# Patient Record
(demographics unavailable — no encounter records)

---

## 2024-11-19 NOTE — HISTORY OF PRESENT ILLNESS
[de-identified] : 16 year old RHD female, presents for initial evaluation c/o left knee pain x 1 month. Patient reports acute onset of intermittent, non-radiating left knee pain, described as "sharp, stabbing" and rated 7/10 on the pain scale. Patient states that she plays softball and volleyball, but denies any direct contact or injury to her left knee. Patient states that pain is worst standing and bending. Patient admits to taking Advil prn, which provides some pain relief. Patient denies prior knee surgery or intra-articular injections. Patient is not participating in PT at this time. Patient ambulates without assistance and can perform ADL's independently. Patient denies any associated knee swelling or buckling.

## 2024-11-19 NOTE — DISCUSSION/SUMMARY
[de-identified] : I went over the pathophysiology of the patient's symptoms in great detail with the patient. I discussed the underlying pathophysiology of the patient's condition in great detail with the patient. I went over the patient's x-rays with them in great detail. At this time, she will start a course of physical therapy for strengthening and flexibility. A prescription was provided. We discussed the use of ice, Tylenol and anti-inflammatories to relieve pain. She should avoid deep squatting, twisting, pivoting and kneeling. We discussed the use of a neoprene sleeve for her left knee.   All of their questions were answered. They understand and consent to the plan.   FU in one month.

## 2024-11-19 NOTE — PHYSICAL EXAM
[de-identified] : Constitutional o Appearance : well-nourished, well developed, alert, in no acute distress  Head and Face o Head :  Inspection : atraumatic, normocephalic o Face :  Inspection : no visible rash or discoloration Respiratory o Respiratory Effort: breathing unlabored  Neurologic o Mental Status Examination :  Orientation : alert and oriented X 3 Psychiatric o Mood and Affect: mood normal, affect appropriate Cardiovascular o Observation/Palpation : - no swelling Lymphatic o Additional Nodes : No palpable lymph nodes present  Right Lower Extremity o Buttock : no tenderness, swelling or deformities  o Right Hip :  Inspection/Palpation : no tenderness, swelling or deformities  Range of Motion : full and painless in all planes, no crepitance  Stability : joint stability intact  Strength : extension, flexion, adduction, abduction, internal rotation and external rotation 5/5   o Right Knee :  Inspection/Palpation : no tenderness to palpation, no swelling  Range of Motion : active flexion and extension full and painless, no crepitance  Stability : no valgus or varus instability present on provocative testing  Strength : flexion and extension 5/5  Tests and Signs : negative Anterior Drawer, negative Lachman, negative Arnoldo  Left Lower Extremity o Buttock : no tenderness, swelling or deformities  o Left Hip :  Inspection/Palpation : no tenderness, no swelling or deformities  Range of Motion : full and painless in all planes, no crepitance  Stability : joint stability intact  Strength : extension, flexion, adduction, abduction, internal rotation and external rotation 5/5  o Left Knee :  Inspection/Palpation : medial facet tenderness, lateral facet tenderness posterior medial and lateral compartment tenderness to palpation, mild swelling  Range of Motion : 0-140 with discomofort active flexion and extension full and painless, no crepitance  Stability : no valgus or varus instability present on provocative testing  Strength : flexion and extension 4/5  Tests and Signs : negative Anterior Drawer, negative Lachman, negative Arnoldo  Gait and Station: Gait: gait normal, no significant extremity swelling or lymphedema, good proprioception and balance  Radiology Results 11/19/2024 o Left Knee : Standing AP, lateral, tunnel, and skyline views of the knee were obtained and reveal growth plates closed no sig degenerative arthritis no osteochondritis no lytic lesions

## 2024-11-19 NOTE — ADDENDUM
[FreeTextEntry1] : I, SELAM CENTENO, acted solely as a scribe for Dr. Theodore Oliver on this date 11/19/2024.  All medical record entries made by the Scribe were at my, Dr. Theodore Oliver, direction and personally dictated by me on 11/19/2024. I have reviewed the chart and agree that the record accurately reflects my personal performance of the history, physical exam, assessment and plan. I have also personally directed, reviewed, and agreed with the chart.

## 2024-12-18 NOTE — PHYSICAL EXAM
[de-identified] : Constitutional o Appearance : well-nourished, well developed, alert, in no acute distress  Head and Face o Head :  Inspection : atraumatic, normocephalic o Face :  Inspection : no visible rash or discoloration Respiratory o Respiratory Effort: breathing unlabored  Neurologic o Mental Status Examination :  Orientation : alert and oriented X 3 Psychiatric o Mood and Affect: mood normal, affect appropriate Cardiovascular o Observation/Palpation : - no swelling Lymphatic o Additional Nodes : No palpable lymph nodes present  Right Lower Extremity o Buttock : no tenderness, swelling or deformities  o Right Hip :  Inspection/Palpation : no tenderness, swelling or deformities  Range of Motion : full and painless in all planes, no crepitance  Stability : joint stability intact  Strength : extension, flexion, adduction, abduction, internal rotation and external rotation 5/5   o Right Knee :  Inspection/Palpation : no tenderness to palpation, no swelling  Range of Motion : active flexion and extension full and painless, no crepitance  Stability : no valgus or varus instability present on provocative testing  Strength : flexion and extension 5/5  Tests and Signs : negative Anterior Drawer, negative Lachman, negative Arnoldo  Left Lower Extremity o Buttock : no tenderness, swelling or deformities  o Left Hip :  Inspection/Palpation : no tenderness, no swelling or deformities  Range of Motion : full and painless in all planes, no crepitance  Stability : joint stability intact  Strength : extension, flexion, adduction, abduction, internal rotation and external rotation 5/5  o Left Knee :  Inspection/Palpation : medial facet tenderness, lateral facet tenderness posterior medial and lateral compartment tenderness to palpation,no swelling, no inferior patella tenderness, painful plica,   Range of Motion : 0-140 with discomofort active flexion and extension full and painless, no crepitance  Stability : no valgus or varus instability present on provocative testing  Strength : flexion and extension 4+/5  Tests and Signs : negative Anterior Drawer, negative Lachman, negative Arnoldo  Gait and Station: Gait: gait normal, tight hamstrings bilaterally, no significant extremity swelling or lymphedema, good proprioception and balance

## 2024-12-18 NOTE — ADDENDUM
[FreeTextEntry1] : I, SELAM CENTENO, acted solely as a scribe for Dr. Theodore Oliver on this date 12/18/2024.  All medical record entries made by the Scribe were at my, Dr. Theodore Oliver, direction and personally dictated by me on 12/18/2024. I have reviewed the chart and agree that the record accurately reflects my personal performance of the history, physical exam, assessment and plan. I have also personally directed, reviewed, and agreed with the chart.

## 2024-12-18 NOTE — HISTORY OF PRESENT ILLNESS
[de-identified] : 16 year old RHD female, presents for a follow-up evaluation c/o left knee pain x 1 month. Patient reports she has intermittent, non-radiating left knee pain, described as "sharp, stabbing" and rated 7/10 on the pain scale. when she does have pain. Patient states that she plays softball and volleyball, but denies any direct contact or injury to her left knee. Patient states that pain is worst standing and bending. Patient admits to taking Advil prn, which provides some pain relief. Patient denies prior knee surgery or intra-articular injections. Patient is participating in PT at this time once or twice a week. She states she is doing exercise everyday at home. Patient ambulates without assistance and can perform ADL's independently. Patient denies any associated knee swelling or buckling. She states physical therapy is helping and her strength has improved.   Radiology Results 11/19/2024 o Left Knee : Standing AP, lateral, tunnel, and skyline views of the knee were obtained and reveal growth plates closed no sig degenerative arthritis no osteochondritis no lytic lesions

## 2025-04-21 NOTE — HISTORY OF PRESENT ILLNESS
[FreeTextEntry1] : Pt is a 16-year-old who presents for Junel FE 1/20 f/u for dysmenorrhea and heavy cycles. Pt reports dysmenorrhea and heavy cycles resolved and is very happy with OCP. No complaints at this time.

## 2025-04-21 NOTE — PHYSICAL EXAM
[de-identified] : Constitutional o Appearance : well-nourished, well developed, alert, in no acute distress  Head and Face o Head :  Inspection : atraumatic, normocephalic o Face :  Inspection : no visible rash or discoloration Respiratory o Respiratory Effort: breathing unlabored  Neurologic o Mental Status Examination :  Orientation : alert and oriented X 3 Psychiatric o Mood and Affect: mood normal, affect appropriate Cardiovascular o Observation/Palpation : - no swelling Lymphatic o Additional Nodes : No palpable lymph nodes present  Right Lower Extremity o Buttock : no tenderness, swelling or deformities  o Right Hip :  Inspection/Palpation : no tenderness, swelling or deformities  Range of Motion : full and painless in all planes, no crepitance  Stability : joint stability intact  Strength : extension, flexion, adduction, abduction, internal rotation and external rotation 4=/5   o Right Knee :  Inspection/Palpation : no medial facet tenderness or lateral facet tenderness  tenderness to palpation, no swelling  Range of Motion : 0-135 active flexion and extension full and painless, no crepitance  Stability : no valgus or varus instability present on provocative testing  Strength : flexion and extension 4+/5  Tests and Signs : negative Anterior Drawer, negative Lachman, negative Arnoldo  Left Lower Extremity o Buttock : no tenderness, swelling or deformities  o Left Hip :  Inspection/Palpation : no tenderness, no swelling or deformities  Range of Motion : full and painless in all planes, no crepitance  Stability : joint stability intact  Strength : extension, flexion, adduction, abduction, internal rotation and external rotation 4+/5  o Left Knee :  Inspection/Palpation : inferior patella tenderness, medial facet tenderness, lateral facet tenderness posterior medial and lateral compartment tenderness to palpation,no swelling, no inferior patella tenderness, painful plica,   Range of Motion : 0-135 with discomofort active flexion and extension full and painless, no crepitance  Stability : no valgus or varus instability present on provocative testing  Strength : flexion and extension 4+/5  Tests and Signs : negative Anterior Drawer, negative Lachman, negative Arnoldo  Gait and Station: Gait: gait normal, tight hamstrings bilaterally, excellent core strength, no significant extremity swelling or lymphedema, good proprioception and balance  Radiology Results 4/21/2025  o Right Knee : Standing AP, lateral and tunnel views of the knee were obtained and revealed skeletal mature no significant degenerative arthritis and mild patella gaye

## 2025-04-21 NOTE — DISCUSSION/SUMMARY
[FreeTextEntry1] : I spent the time noted on the day of this patient encounter preparing for, providing and documenting the above service. I have  counseled and educated the patient on the differential, workup, disease course, and treatment/management plan. Education was provided to the patient during this encounter. All questions and concerns were answered and addressed in detail.  Ivis Cochran NP, FNP-BC

## 2025-04-21 NOTE — DISCUSSION/SUMMARY
[de-identified] : I went over the pathophysiology of the patient's symptoms in great detail with the patient. I discussed the underlying pathophysiology of the patient's condition in great detail with the patient. I went over the patient's x-rays with them in great detail. I am recommending the patient continues to go to physical therapy to obtain increases in their strength and mobility. A prescription was provided. At-home strengthening exercises were discussed and demonstrated with the patient. We discussed the use of ice and Diclofenac 75mg twice a day to relieve pain.   All of their questions were answered. They understand and consent to the plan.   FU in 4-6 weeks.

## 2025-04-21 NOTE — HISTORY OF PRESENT ILLNESS
[de-identified] : 16 year old female presents complaining of bilateral knee pain. Patient reports she has intermittent, non-radiating left knee pain, described as "sharp, stabbing" and rated 7/10 on the pain scale. when she does have pain. Patient states that she plays softball and volleyball, but denies any direct contact or injury to her left knee. Patient states that pain is worst standing and bending. Patient admits to taking Aleve prn, which provides some pain relief. Patient denies prior knee surgery or intra-articular injections. Patient is not participating in PT at this time.   Radiology Results 11/19/2024 o Left Knee : Standing AP, lateral, tunnel, and skyline views of the knee were obtained and reveal growth plates closed no sig degenerative arthritis no osteochondritis no lytic lesions

## 2025-04-21 NOTE — ADDENDUM
[FreeTextEntry1] : I, SELAM CENTENO, acted solely as a scribe for Dr. Theodore Oliver on this date 04/21/2025.  All medical record entries made by the Scribe were at my, Dr. Theodore Oliver, direction and personally dictated by me on 04/21/2025. I have reviewed the chart and agree that the record accurately reflects my personal performance of the history, physical exam, assessment and plan. I have also personally directed, reviewed, and agreed with the chart.

## 2025-04-21 NOTE — COUNSELING
Impression: Exudative macular degeneration, with active choroidal neovascularization, right eye Plan: Active CNVM OD - continue [id number] Eylea OD - active subretinal heme and fluid with [facility]ent[facility] (Dr Hubert Ross). Now resolving on Eylea OD Eylea 3/3 OD given today without complication. R/B/A discussed at length.  Review in 4 weeks for a dilated exam. [Contraception/ Emergency Contraception/ Safe Sexual Practices] : contraception, emergency contraception, safe sexual practices [Confidentiality] : confidentiality

## 2025-07-12 NOTE — HISTORY OF PRESENT ILLNESS
[de-identified] : Briseida is a 17 year old female here for further evaluation and treatment with complaints of bright red blood per rectum. She had 2 full days of seeing bright red blood per rectum. Initially, she thought it was menses. She saw blood with wiping about 1 week ago and also noted the blood turned the whole toilet bowl red. She has also had complaints of abdominal pain. She reports her pain is worse with dairy products and rich, heavy foods such as strawberry shortcake. Her pain is noted to be crampy in character. Her pain does not occur with every time she eats dairy and could be random at times. As a result, sometimes, she is afraid to eat for fear of having recurrent pain. She was previously stooling bristol 1-2 took colace for those days. Thereafter, she no longer needed to take the colace. She recently underwent ACL repair and was also on pain medications which she is no longer taking.  She recently lost lose weight 12-15lbs with the surgery. She is currently undergoing physical therapy.  She reports waking up at 4am every night and goes back to sleep and wakes up tired. She has to wake up at 6:30am for school. She denies history of perianal itching.   diet hx:  yogurt lunch sandwich quesadilla go out pasta or meat  80% cheese and chicken nuggets and bread chocolate  salad once in a while carrots and lettuce plays softball  PMH: otherwise healthy PSH: ACL repair Medications: previously on oxycodone for pain she is no longer on pain medications and is s/p colace, she is on supplements, she is currently on aspirin Allergies: tree nuts throat itchy and PCN hives has an epipen, benadryl  Family hx: sister aged 15 years who is healthy, ACL surgery in mom; mom with history of IBS treated with phenobarbital and gallstones at age 17 gastric ulcers in college Social: Lives with mom and dad and sister, and animals including dogs and rabbit and bearded dragon and cat. All animals are healthy, mom works as a vet

## 2025-07-12 NOTE — PHYSICAL EXAM
[Well Developed] : well developed [NAD] : in no acute distress [PERRL] : pupils were equal, round, reactive to light  [icteric] : anicteric [Moist & Pink Mucous Membranes] : moist and pink mucous membranes [CTAB] : lungs clear to auscultation bilaterally [Respiratory Distress] : no respiratory distress  [Regular Rate and Rhythm] : regular rate and rhythm [Normal S1, S2] : normal S1 and S2 [Soft] : soft  [Distended] : non distended [Tender] : non tender [Normal Bowel Sounds] : normal bowel sounds [No HSM] : no hepatosplenomegaly appreciated [Normal Position] : normal position [Tags] : no skin tags  [Fissure] : no anal fissures  [Fistula] : no fistulas [Rectal Prolapse] : no rectal prolapse [Normal Tone] : normal tone [Well-Perfused] : well-perfused [Edema] : no edema [Cyanosis] : no cyanosis [Rash] : no rash [Jaundice] : no jaundice [Interactive] : interactive [de-identified] : redundant skin [de-identified] : leg brace in place

## 2025-07-12 NOTE — ASSESSMENT
[FreeTextEntry1] : Briseida is a 17-year-old female here for further evaluation and treatment with history of bright red blood per rectum.  She recently underwent knee surgery and was being treated with oxycodone for her pain and suffered from constipation.  Likely the cause of her symptoms is secondary to a hemorrhoid or fissure.  I have recommended for Briseida to take MiraLAX 2 capfuls once daily for 2 days followed by 1 capful daily to promote soft daily stools.  Further nutritional education was provided and recommended increasing her fiber and water intake.  I have also recommended for Briseida to use a footstool to increase her intra-abdominal pressure and decrease her rectal pressure while defecating.  I have also recommended lab work to be performed to rule out other possible causes related to blood in the stool.  I have recommended for Briseida to follow-up in 2 months to reassess her clinical status.

## 2025-07-12 NOTE — HISTORY OF PRESENT ILLNESS
[de-identified] : Briseida is a 17 year old female here for further evaluation and treatment with complaints of bright red blood per rectum. She had 2 full days of seeing bright red blood per rectum. Initially, she thought it was menses. She saw blood with wiping about 1 week ago and also noted the blood turned the whole toilet bowl red. She has also had complaints of abdominal pain. She reports her pain is worse with dairy products and rich, heavy foods such as strawberry shortcake. Her pain is noted to be crampy in character. Her pain does not occur with every time she eats dairy and could be random at times. As a result, sometimes, she is afraid to eat for fear of having recurrent pain. She was previously stooling bristol 1-2 took colace for those days. Thereafter, she no longer needed to take the colace. She recently underwent ACL repair and was also on pain medications which she is no longer taking.  She recently lost lose weight 12-15lbs with the surgery. She is currently undergoing physical therapy.  She reports waking up at 4am every night and goes back to sleep and wakes up tired. She has to wake up at 6:30am for school. She denies history of perianal itching.   diet hx:  yogurt lunch sandwich quesadilla go out pasta or meat  80% cheese and chicken nuggets and bread chocolate  salad once in a while carrots and lettuce plays softball  PMH: otherwise healthy PSH: ACL repair Medications: previously on oxycodone for pain she is no longer on pain medications and is s/p colace, she is on supplements, she is currently on aspirin Allergies: tree nuts throat itchy and PCN hives has an epipen, benadryl  Family hx: sister aged 15 years who is healthy, ACL surgery in mom; mom with history of IBS treated with phenobarbital and gallstones at age 17 gastric ulcers in college Social: Lives with mom and dad and sister, and animals including dogs and rabbit and bearded dragon and cat. All animals are healthy, mom works as a vet

## 2025-07-12 NOTE — ASSESSMENT
[FreeTextEntry1] : Briseida is a 17-year-old female here for further evaluation and treatment with history of bright red blood per rectum.  She recently underwent knee surgery and was being treated with oxycodone for her pain and suffered from constipation.  Likely the cause of her symptoms is secondary to a hemorrhoid or fissure.  I have recommended for Briesida to take MiraLAX 2 capfuls once daily for 2 days followed by 1 capful daily to promote soft daily stools.  Further nutritional education was provided and recommended increasing her fiber and water intake.  I have also recommended for Briseida to use a footstool to increase her intra-abdominal pressure and decrease her rectal pressure while defecating.  I have also recommended lab work to be performed to rule out other possible causes related to blood in the stool.  I have recommended for Briseida to follow-up in 2 months to reassess her clinical status.

## 2025-07-12 NOTE — PHYSICAL EXAM
[Well Developed] : well developed [NAD] : in no acute distress [PERRL] : pupils were equal, round, reactive to light  [icteric] : anicteric [Moist & Pink Mucous Membranes] : moist and pink mucous membranes [CTAB] : lungs clear to auscultation bilaterally [Respiratory Distress] : no respiratory distress  [Regular Rate and Rhythm] : regular rate and rhythm [Normal S1, S2] : normal S1 and S2 [Soft] : soft  [Distended] : non distended [Tender] : non tender [Normal Bowel Sounds] : normal bowel sounds [No HSM] : no hepatosplenomegaly appreciated [Normal Position] : normal position [Tags] : no skin tags  [Fissure] : no anal fissures  [Fistula] : no fistulas [Rectal Prolapse] : no rectal prolapse [Normal Tone] : normal tone [Well-Perfused] : well-perfused [Edema] : no edema [Cyanosis] : no cyanosis [Rash] : no rash [Jaundice] : no jaundice [Interactive] : interactive [de-identified] : redundant skin [de-identified] : leg brace in place